# Patient Record
Sex: FEMALE | Race: WHITE | NOT HISPANIC OR LATINO
[De-identification: names, ages, dates, MRNs, and addresses within clinical notes are randomized per-mention and may not be internally consistent; named-entity substitution may affect disease eponyms.]

---

## 2019-06-25 ENCOUNTER — TRANSCRIPTION ENCOUNTER (OUTPATIENT)
Age: 29
End: 2019-06-25

## 2021-06-04 ENCOUNTER — TRANSCRIPTION ENCOUNTER (OUTPATIENT)
Age: 31
End: 2021-06-04

## 2022-03-14 ENCOUNTER — OUTPATIENT (OUTPATIENT)
Dept: OUTPATIENT SERVICES | Facility: HOSPITAL | Age: 32
LOS: 1 days | Discharge: HOME | End: 2022-03-14

## 2022-03-14 DIAGNOSIS — N97.9 FEMALE INFERTILITY, UNSPECIFIED: ICD-10-CM

## 2023-03-05 PROBLEM — Z00.00 ENCOUNTER FOR PREVENTIVE HEALTH EXAMINATION: Status: ACTIVE | Noted: 2023-03-05

## 2023-03-06 ENCOUNTER — APPOINTMENT (OUTPATIENT)
Dept: OBGYN | Facility: CLINIC | Age: 33
End: 2023-03-06
Payer: COMMERCIAL

## 2023-03-06 VITALS — SYSTOLIC BLOOD PRESSURE: 112 MMHG | DIASTOLIC BLOOD PRESSURE: 72 MMHG

## 2023-03-06 VITALS — BODY MASS INDEX: 30.73 KG/M2 | WEIGHT: 180 LBS | TEMPERATURE: 98 F | HEIGHT: 64 IN

## 2023-03-06 DIAGNOSIS — Z78.9 OTHER SPECIFIED HEALTH STATUS: ICD-10-CM

## 2023-03-06 DIAGNOSIS — N92.1 EXCESSIVE AND FREQUENT MENSTRUATION WITH IRREGULAR CYCLE: ICD-10-CM

## 2023-03-06 PROCEDURE — 99385 PREV VISIT NEW AGE 18-39: CPT

## 2023-03-06 RX ORDER — METHYLPREDNISOLONE 4 MG/1
4 TABLET ORAL
Qty: 21 | Refills: 0 | Status: COMPLETED | COMMUNITY
Start: 2022-09-21

## 2023-03-06 RX ORDER — OXYCODONE AND ACETAMINOPHEN 5; 325 MG/1; MG/1
5-325 TABLET ORAL
Qty: 6 | Refills: 0 | Status: COMPLETED | COMMUNITY
Start: 2022-09-30

## 2023-03-06 RX ORDER — AMOXICILLIN AND CLAVULANATE POTASSIUM 875; 125 MG/1; MG/1
875-125 TABLET, COATED ORAL
Qty: 10 | Refills: 0 | Status: COMPLETED | COMMUNITY
Start: 2022-09-21

## 2023-03-06 RX ORDER — TRANEXAMIC ACID 650 MG/1
650 TABLET ORAL
Qty: 12 | Refills: 0 | Status: COMPLETED | COMMUNITY
Start: 2022-09-21

## 2023-03-06 RX ORDER — ALPRAZOLAM 0.5 MG/1
0.5 TABLET ORAL
Qty: 30 | Refills: 0 | Status: COMPLETED | COMMUNITY
Start: 2022-12-20

## 2023-03-06 NOTE — HISTORY OF PRESENT ILLNESS
[FreeTextEntry1] : 33 yo G0 LMP approx 2/20/23 presents to establish gyn care. She denies any acute complaints including breast pain, breast changes, abdominal pain, pelvic pain, abnormal vaginal bleeding, vaginal discharge, dyspareunia, or urinary incontinence.\par \par She notes that she had an abnormal pap smear with cervix cryoablation 1 year ago with Dr. Agarwal, who recently passed away. She has not had any subsequent pap smears yet.\par \par She follows with [facility], for suspected PCOS, which was ruled out. She is now trying to conceive a child. She had normal fertility testing except for mildly low sperm count in her partner. She was recommended clomid due to long cycles and suspected anovulation, did 1 cycle, but then decided that she wanted to try naturally for a while. She plans to return to Dr. Duvall in the Fall if she is not pregnant by then.\par \par OB: Nulligravida\par GYN: Denies h/o fibroids, cysts, STIs. H/o abnormal pap with cervix cryoablation 2022. Regular menses with 35 day cycles.\par PMH: Denies\par PSH: Breast augmentation (silicone implants below the muscle), cervix cryoablation\par Meds: none

## 2023-03-06 NOTE — DISCUSSION/SUMMARY
[FreeTextEntry1] : 31 yo G0 LMP approx 2/20/23 with normal annual exam and prolonged menses\par - Pap and HPV collected\par - Discussed conception, pt has good understanding of fertility planning, does not want to be active with ovulation tracking or other treatments for a while. Has a clear deadline in her mind of returning to [facility] after the Summer, I encouraged her to stick to her plan as fertility is less reliable with age.\par - Discussed concept of anovulation, particularly in the setting of prolonged menses, all questions answered.\par - Return 1 year for next annual, sooner if needed or if pregnant

## 2023-03-08 LAB
CYTOLOGY CVX/VAG DOC THIN PREP: NORMAL
HPV HIGH+LOW RISK DNA PNL CVX: NOT DETECTED

## 2024-03-22 ENCOUNTER — APPOINTMENT (OUTPATIENT)
Dept: OBGYN | Facility: CLINIC | Age: 34
End: 2024-03-22
Payer: COMMERCIAL

## 2024-03-22 ENCOUNTER — TRANSCRIPTION ENCOUNTER (OUTPATIENT)
Age: 34
End: 2024-03-22

## 2024-03-22 VITALS — HEIGHT: 64 IN | WEIGHT: 180 LBS | BODY MASS INDEX: 30.73 KG/M2

## 2024-03-22 VITALS — DIASTOLIC BLOOD PRESSURE: 70 MMHG | SYSTOLIC BLOOD PRESSURE: 116 MMHG

## 2024-03-22 DIAGNOSIS — Z01.419 ENCOUNTER FOR GYNECOLOGICAL EXAMINATION (GENERAL) (ROUTINE) W/OUT ABNORMAL FINDINGS: ICD-10-CM

## 2024-03-22 PROCEDURE — 99395 PREV VISIT EST AGE 18-39: CPT

## 2024-03-22 NOTE — HISTORY OF PRESENT ILLNESS
[FreeTextEntry1] : 32 yo G0 LMP 2/24/24 presents for annual visit. She denies any acute complaints including breast pain, breast changes, abdominal pain, pelvic pain, abnormal vaginal bleeding, vaginal discharge, dyspareunia, or urinary incontinence.  She has been attempting conception naturally since last year without any results. She would like to continue for not but was recommended by Dr. Duvall to use clomid for anovulation. She is considering going back to see him soon.  OB: Nulligravida GYN: Denies h/o fibroids, cysts, STIs. H/o abnormal pap smear with cervix cryoablation 2022. Last pap smear 3/2023 NILM HPV negative Somewhat regular menses with 35 day cycles, often skips 1 period per year. PMH: Denies PSH: Breast augmentation (silicone implants subpectoral), cervix cryoablation Meds: none 100.2

## 2024-03-22 NOTE — DISCUSSION/SUMMARY
[FreeTextEntry1] :  32 yo G0 LMP 2/24/24 with normal annual exam - Pap smear and HPV collected - Discussed anovulation, discussed expected fertility rates with and without anovulation, likely will need clomid as fertility rate is very low. She has good understanding but would like to continue for now, will see Dr. Graham again when she is ready. - Next annual 1 year

## 2024-03-22 NOTE — PHYSICAL EXAM
[Appropriately responsive] : appropriately responsive [Alert] : alert [No Acute Distress] : no acute distress [Soft] : soft [Non-tender] : non-tender [Non-distended] : non-distended [No Mass] : no mass [No Lesions] : no lesions [Oriented x3] : oriented x3 [Examination Of The Breasts] : a normal appearance [No Masses] : no breast masses were palpable [Labia Majora] : normal [Labia Minora] : normal [No Bleeding] : There was no active vaginal bleeding [Normal] : normal [Enlarged ___ wks] : not enlarged [Tenderness] : nontender [Anteversion] : anteverted [Mass ___ cm] : no uterine mass was palpated [Uterine Adnexae] : normal

## 2024-03-26 LAB — HPV HIGH+LOW RISK DNA PNL CVX: NOT DETECTED

## 2024-03-28 LAB — CYTOLOGY CVX/VAG DOC THIN PREP: NORMAL
